# Patient Record
Sex: FEMALE | Race: WHITE | NOT HISPANIC OR LATINO | Employment: UNEMPLOYED | URBAN - METROPOLITAN AREA
[De-identification: names, ages, dates, MRNs, and addresses within clinical notes are randomized per-mention and may not be internally consistent; named-entity substitution may affect disease eponyms.]

---

## 2023-10-23 ENCOUNTER — HOSPITAL ENCOUNTER (EMERGENCY)
Facility: CLINIC | Age: 48
Discharge: HOME | End: 2023-10-23
Attending: EMERGENCY MEDICINE | Admitting: EMERGENCY MEDICINE
Payer: MEDICAID

## 2023-10-23 VITALS
OXYGEN SATURATION: 100 % | HEART RATE: 74 BPM | WEIGHT: 126.2 LBS | RESPIRATION RATE: 18 BRPM | DIASTOLIC BLOOD PRESSURE: 61 MMHG | TEMPERATURE: 98 F | SYSTOLIC BLOOD PRESSURE: 116 MMHG | BODY MASS INDEX: 20.28 KG/M2 | HEIGHT: 66 IN

## 2023-10-23 DIAGNOSIS — F11.90 OPIOID USE DISORDER: ICD-10-CM

## 2023-10-23 LAB
ALBUMIN SERPL BCG-MCNC: 4.6 G/DL (ref 3.5–5.2)
ALP SERPL-CCNC: 165 U/L (ref 35–104)
ALT SERPL W P-5'-P-CCNC: 46 U/L (ref 0–50)
ANION GAP SERPL CALCULATED.3IONS-SCNC: 13 MMOL/L (ref 7–15)
AST SERPL W P-5'-P-CCNC: 30 U/L (ref 0–45)
BASOPHILS # BLD AUTO: 0.1 10E3/UL (ref 0–0.2)
BASOPHILS NFR BLD AUTO: 1 %
BILIRUB SERPL-MCNC: 0.7 MG/DL
BUN SERPL-MCNC: 11 MG/DL (ref 6–20)
CALCIUM SERPL-MCNC: 9.2 MG/DL (ref 8.6–10)
CHLORIDE SERPL-SCNC: 102 MMOL/L (ref 98–107)
CREAT SERPL-MCNC: 0.7 MG/DL (ref 0.51–0.95)
DEPRECATED HCO3 PLAS-SCNC: 25 MMOL/L (ref 22–29)
EGFRCR SERPLBLD CKD-EPI 2021: >90 ML/MIN/1.73M2
EOSINOPHIL # BLD AUTO: 0.1 10E3/UL (ref 0–0.7)
EOSINOPHIL NFR BLD AUTO: 2 %
ERYTHROCYTE [DISTWIDTH] IN BLOOD BY AUTOMATED COUNT: 13.2 % (ref 10–15)
GLUCOSE SERPL-MCNC: 113 MG/DL (ref 70–99)
HCT VFR BLD AUTO: 43.5 % (ref 35–47)
HGB BLD-MCNC: 14.6 G/DL (ref 11.7–15.7)
IMM GRANULOCYTES # BLD: 0 10E3/UL
IMM GRANULOCYTES NFR BLD: 0 %
LYMPHOCYTES # BLD AUTO: 1.4 10E3/UL (ref 0.8–5.3)
LYMPHOCYTES NFR BLD AUTO: 22 %
MCH RBC QN AUTO: 29.9 PG (ref 26.5–33)
MCHC RBC AUTO-ENTMCNC: 33.6 G/DL (ref 31.5–36.5)
MCV RBC AUTO: 89 FL (ref 78–100)
MONOCYTES # BLD AUTO: 0.4 10E3/UL (ref 0–1.3)
MONOCYTES NFR BLD AUTO: 6 %
NEUTROPHILS # BLD AUTO: 4.6 10E3/UL (ref 1.6–8.3)
NEUTROPHILS NFR BLD AUTO: 69 %
NRBC # BLD AUTO: 0 10E3/UL
NRBC BLD AUTO-RTO: 0 /100
PLATELET # BLD AUTO: 264 10E3/UL (ref 150–450)
POTASSIUM SERPL-SCNC: 3.8 MMOL/L (ref 3.4–5.3)
PROT SERPL-MCNC: 7.6 G/DL (ref 6.4–8.3)
RBC # BLD AUTO: 4.89 10E6/UL (ref 3.8–5.2)
SODIUM SERPL-SCNC: 140 MMOL/L (ref 135–145)
WBC # BLD AUTO: 6.7 10E3/UL (ref 4–11)

## 2023-10-23 PROCEDURE — 36415 COLL VENOUS BLD VENIPUNCTURE: CPT | Performed by: EMERGENCY MEDICINE

## 2023-10-23 PROCEDURE — 99283 EMERGENCY DEPT VISIT LOW MDM: CPT | Performed by: EMERGENCY MEDICINE

## 2023-10-23 PROCEDURE — 85014 HEMATOCRIT: CPT | Performed by: EMERGENCY MEDICINE

## 2023-10-23 PROCEDURE — 80053 COMPREHEN METABOLIC PANEL: CPT | Performed by: EMERGENCY MEDICINE

## 2023-10-23 ASSESSMENT — ACTIVITIES OF DAILY LIVING (ADL)
ADLS_ACUITY_SCORE: 35

## 2023-10-23 ASSESSMENT — LIFESTYLE VARIABLES: TOTAL_SCORE: 2

## 2023-10-23 NOTE — ED PROVIDER NOTES
"    [facility] EMERGENCY DEPARTMENT ([facility])    10/23/23      ED PROVIDER NOTE   Isaias B  History     Chief Complaint   Patient presents with    Addiction Problem     Patient is here for detox for fentanyl, she states her last use was this morning. She states to be using about a 1/2 gram a day. Patient states at the moment to feel irritable, hungry.      The history is provided by the patient and medical records.     Niki Salamanca is a 48 year old female who presents to the emergency department seeking assistance with opiate use disorder.  She has been using half a gram of fentanyl a day, last use was 7 AM this morning.  She is starting to withdraw and is feeling irritable and hungry, though notes that the major symptoms of withdrawal usually take effect 6 to 8 hours after last use.  Patient states that withdrawal symptoms are full body aches and \"feels like I am dying\".  Patient states she takes half a gram a day to not feel withdrawals.  Patient has been using Fentanyl for the past couple years.  Patient was on methadone 5 to 6 years ago however stopped medication cold turkey.  Patient states she is tired of being on fentanyl and having withdrawals and her kids miss her.  Patient states she would like anything to help and would like to go to treatment.  Patient gets fentanyl from \"everywhere\", including her friends and smokes it.  Patient denies IV drug use at this time however has a past history of IV use.  Patient denies falls, fever, chills, shortness of breath.  Patient would rather go to treatment as opposed to at home prescription medication.  Patient is depressed however denies suicidal ideation.  Last COWS score of 2.    Past Medical History  No past medical history on file.  No past surgical history on file.  No current outpatient medications on file.    Allergies   Allergen Reactions    Penicillins      Family History  No family history on file.  Social History          A medically " "appropriate review of systems was performed with pertinent positives and negatives noted in the HPI, and all other systems negative.    Physical Exam   BP: 116/61  Pulse: 74  Temp: 98  F (36.7  C)  Resp: 18  Height: 167.6 cm (5' 6\")  Weight: 57.2 kg (126 lb 3.2 oz)  SpO2: 100 %    Physical Exam  General:  No acute distress.  No piloerection or yawning  HENT:  Normocephalic and atraumatic.  Poor dentition  Eyes: EOMI. Conjunctivae normal.   Cardiovascular: Normal rate and regular rhythm.  Normal heart sounds. No murmur heard.  Pulmonary:  No respiratory distress. Normal breath sounds.   Abdominal: There is no distension.  Abdomen is soft. There is no mass.  There is no abdominal tenderness.   Musculoskeletal: No swelling or tenderness.  Moving all extremities spontaneously.    Skin: Warm and dry  Neurological: No focal deficit present.   Mood and Affect: Mood normal.     ED Course, Procedures, & Data      Procedures          Results for orders placed or performed during the hospital encounter of 10/23/23   Comprehensive metabolic panel     Status: Abnormal   Result Value Ref Range    Sodium 140 135 - 145 mmol/L    Potassium 3.8 3.4 - 5.3 mmol/L    Carbon Dioxide (CO2) 25 22 - 29 mmol/L    Anion Gap 13 7 - 15 mmol/L    Urea Nitrogen 11.0 6.0 - 20.0 mg/dL    Creatinine 0.70 0.51 - 0.95 mg/dL    GFR Estimate >90 >60 mL/min/1.73m2    Calcium 9.2 8.6 - 10.0 mg/dL    Chloride 102 98 - 107 mmol/L    Glucose 113 (H) 70 - 99 mg/dL    Alkaline Phosphatase 165 (H) 35 - 104 U/L    AST 30 0 - 45 U/L    ALT 46 0 - 50 U/L    Protein Total 7.6 6.4 - 8.3 g/dL    Albumin 4.6 3.5 - 5.2 g/dL    Bilirubin Total 0.7 <=1.2 mg/dL   CBC with platelets and differential     Status: None   Result Value Ref Range    WBC Count 6.7 4.0 - 11.0 10e3/uL    RBC Count 4.89 3.80 - 5.20 10e6/uL    Hemoglobin 14.6 11.7 - 15.7 g/dL    Hematocrit 43.5 35.0 - 47.0 %    MCV 89 78 - 100 fL    MCH 29.9 26.5 - 33.0 pg    MCHC 33.6 31.5 - 36.5 g/dL    RDW 13.2 " 10.0 - 15.0 %    Platelet Count 264 150 - 450 10e3/uL    % Neutrophils 69 %    % Lymphocytes 22 %    % Monocytes 6 %    % Eosinophils 2 %    % Basophils 1 %    % Immature Granulocytes 0 %    NRBCs per 100 WBC 0 <1 /100    Absolute Neutrophils 4.6 1.6 - 8.3 10e3/uL    Absolute Lymphocytes 1.4 0.8 - 5.3 10e3/uL    Absolute Monocytes 0.4 0.0 - 1.3 10e3/uL    Absolute Eosinophils 0.1 0.0 - 0.7 10e3/uL    Absolute Basophils 0.1 0.0 - 0.2 10e3/uL    Absolute Immature Granulocytes 0.0 <=0.4 10e3/uL    Absolute NRBCs 0.0 10e3/uL   CBC with platelets differential     Status: None    Narrative    The following orders were created for panel order CBC with platelets differential.  Procedure                               Abnormality         Status                     ---------                               -----------         ------                     CBC with platelets and d...[[id number]]                      Final result                 Please view results for these tests on the individual orders.     Medications - No data to display  Labs Ordered and Resulted from Time of ED Arrival to Time of ED Departure   COMPREHENSIVE METABOLIC PANEL - Abnormal       Result Value    Sodium 140      Potassium 3.8      Carbon Dioxide (CO2) 25      Anion Gap 13      Urea Nitrogen 11.0      Creatinine 0.70      GFR Estimate >90      Calcium 9.2      Chloride 102      Glucose 113 (*)     Alkaline Phosphatase 165 (*)     AST 30      ALT 46      Protein Total 7.6      Albumin 4.6      Bilirubin Total 0.7     CBC WITH PLATELETS AND DIFFERENTIAL    WBC Count 6.7      RBC Count 4.89      Hemoglobin 14.6      Hematocrit 43.5      MCV 89      MCH 29.9      MCHC 33.6      RDW 13.2      Platelet Count 264      % Neutrophils 69      % Lymphocytes 22      % Monocytes 6      % Eosinophils 2      % Basophils 1      % Immature Granulocytes 0      NRBCs per 100 WBC 0      Absolute Neutrophils 4.6      Absolute Lymphocytes 1.4      Absolute Monocytes 0.4       Absolute Eosinophils 0.1      Absolute Basophils 0.1      Absolute Immature Granulocytes 0.0      Absolute NRBCs 0.0     HCG QUALITATIVE URINE     No orders to display          Critical care was not performed.     Medical Decision Making  The patient's presentation was of moderate complexity (2 or more stable chronic illnesses).    The patient's evaluation involved:  review of external note(s) from 3+ sources (see separate area of note for details)  ordering and/or review of 3+ test(s) in this encounter (see separate area of note for details)  review of 3+ test result(s) ordered prior to this encounter (see separate area of note for details)    The patient's management necessitated low risk treatment    Assessment & Plan    Patient presents emergency department wanting to quit fentanyl use, which she has been using daily.  She requests a treatment center, rather than being started on buprenorphine, as she does not feel that she would be able to quit on her own at home.  Exam is unremarkable.  She was hungry and provided food which she tolerated without difficulty.  COWS score is 2, not high enough to start emergency department induction.  Patient was accepted to an opioid detox center, and nursing provided a cab so she could go directly there.  CBC, CMP, beta hCG, urine drug screen initially ordered (not because patient was having symptoms) but in case the facility required it before patient arrival.  They did not require any work-up.  CBC and CMP had resulted.  Alkaline phosphatase is elevated 165, however no elevation in her liver enzymes or bilirubin.  Patient was discharged in a cab to go directly to detox, and they are able to initiate buprenorphine for her.      I have reviewed the nursing notes. I have reviewed the findings, diagnosis, plan and need for follow up with the patient.    New Prescriptions    No medications on file       Final diagnoses:   None     [facility] EMERGENCY  DEPARTMENT  10/23/2023     Stefanie Trejo MD  10/23/23 9931

## 2023-10-23 NOTE — ED TRIAGE NOTES
Patient is here for detox for fentanyl, she states her last use was this morning. She states to be using about a 1/2 gram a day. Patient states at the moment to feel irritable, hungry.      Triage Assessment (Adult)       Row Name 10/23/23 1042          Triage Assessment    Airway WDL WDL        Respiratory WDL    Respiratory WDL WDL        Skin Circulation/Temperature WDL    Skin Circulation/Temperature WDL WDL        Cardiac WDL    Cardiac WDL WDL        Peripheral/Neurovascular WDL    Peripheral Neurovascular WDL WDL        Cognitive/Neuro/Behavioral WDL    Cognitive/Neuro/Behavioral WDL WDL

## 2023-10-23 NOTE — DISCHARGE INSTRUCTIONS
Go directly to treatment center from the emergency department.      Substance Use Disorder Direct Access Resources    It is recommended that you abstain from all mood altering chemicals. Please contact the sober support hotline ([phone]) as needed; phones are answered 24 hours a day, 7 days a week.    To access substance use treatment you must have a comprehensive assessment completed to begin any treatment program.     If uninsured, please contact your county of residence for eligibility screen to substance use disorder evaluation and treatment:    [place] - [phone]   [place]  [phone]   [facility] [phone]   [place]  [phone]   [place]  [phone]   [facility] [phone]   [facility] [phone]   [place] - [phone]     If you have private insurance, call the customer service number on the back of your insurance card to find an in-network substance abuse use disorder assessment. The ideal provider will be a treatment facility, licensed in the [facility].     Community THERESE Evaluations: Clients may call their county for a full list of providers - Availability and services listed belo are subject to change, please call the provider to confirm    [facility]  [phone]  [facility] [place], [place], [address]  *Please call the above number to schedule a comprehensive assessment for determination of level of care needs. In person and virtual appointments available Mon-Fri.    [facility] [address] Floor, [address], [place] [address] (next to the outpatient lab)    Phone: [phone]   Provides bridging services to people with Opiate Use Disorders (OUD) seeking care. This is a front door to Medication Assisted Treatments (MAT), ages 16+  Walk In hours: Monday-Friday 9:00am-3:00pm    [facility]  [phone]  Walk in Assessments: Mon-Friday 7a-1:45p  [address], [place], [address]    [facility]  [facility]  Central Access [phone]  [address] [place], [place], [address]  *by appointment only    [place]  [phone] (phone consultation available )  Locations in: [place], [place], [place], [place], and [place], [place]  Thai virtual IOP [phone] or visit [email or web address]   Also offers LGBTQ programming     [facility]  [phone]  [address]  [place], [place], [address]  *Currently only offered via telehealth - call to set up an appointment    [facility]  [address] [place], [place], [address]  [facility]  For more information to to make a referral call:  [phone]  Walk-in on   9-11 a.m.    [facility]  [phone]  [address] [place], [place], [address]  *available by appointments only    [facility]  [phone]  [address] [place], [place], [address]  *available by appointment only    [facility]  [phone]  [address] [place], [place], [address]  *walk in assessments available M-F starting at 7 am.    [facility]  [phone]  Locations: [facility], [facility], and [place]  *Walk in assessments availble M-F starting at 8 am -virtual only    [facility]  [phone]  [address] [place] [address]    [facility]  Virtual + Locations: [place], [place], [place], [place], [facility]], [place], [place], [place]   [phone]  *available by appointment only    [facility]  [phone]  [address]  [place], [place], [address]    [facility]  [phone]  [address] [place], [place], [address]  *available by appointment    [facility]  [phone]  [address] [place], [place], [address]  *walk ins available M-TH from 9-3    [facility] program: [phone]  [address] [place], [place], [address]    [place]  [phone]  Same day substance use disorder assessments are available Monday - Friday, via walk-in or by appointment at the [place] location.  [address], [address], [place] [address]     [facility] - adolescent and adult SUDs services  [phone]  Offer services Monday through Friday, as well as evening hours Monday through Thursday. Normally, a first appointment will be scheduled within one week  [email or web address]  Locations all over [place]    If you are intoxicated, you may be required to detox at a detox facility before starting treatment. The following are detox facilities that you can self present to. All detox facilities are able to help you complete an assessment prior to discharge if you choose:    [facility]: Arrive at a [place] [facility] for immediate medical evaluation    [facility]: [address] [place], [place], [address].         [phone]    [facility] [place]: [address] [place], [place], [address]  [phone]     [facility] ([facility]): [address] [place], [place], [address] [phone]     [facility]: [address] [place], [place], [address], [phone]         Ways to help cope with sobriety:    -- Take prescribed medicines as scheduled  -- Keep follow-up appointments  -- Talk to others about your concerns  -- Get regular exercise  -- Practice deep breathing skills  -- Eat a healthy diet  -- Use community resources, including hotline numbers, [facility] crisis and support meetings  -- Stay sober and avoid places/people/things associated with substance use  --Maintain a daily schedule/routine  --Get at least 7-8 hours of sleep per night  --Create a list 10--20 healthy activities that you can do that are enjoyable and do not involve substance use  --Create daily goals (approx. 1-4 goals) per day and work to  achieve them throughout the day.       Free Resources:    [facility] ([facility])  [facility] connects people seeking recovery to resources that help foster and sustain long-term recovery. Whether you are seeking resources for treatment, transportation, housing, job training, education, health care or other pathways to recovery, [facility] is a great place to start.  Phone: 678.980.8001. www.[place]Uniweb.ru.[employer] (Great listing of all types of recovery and non-recovery related resources)    Alcoholics Anonymous  Phone: 7-731-ALCOHOL  Website: HTTP://WWW.AA.ORG/  [employer] [place] (806-052-4191 or http://aaCloudSync.org)  [facility] [place] (903-612-7503 or www.aastpaul.org)     Narcotics Anonymous  Phone: 101.315.7984  Website: www.Recruit.net.[employer].    [employer] [address], [place], [place],  Phone: [phone]  Drop-in Hours: Monday-Friday 9-11:30 am. By appointment at other times.  Provides: [facility] is a drop-in center on the east side [facility] that provides a safe space for individuals who are homeless and have a history of chemical use. Sobriety is not a requirement but drugs and alcohol are not allowed on the property.  Services: Non-clients can access drop-in services such as Recovery and Harm Reduction Groups, referrals to case management, community activities, shower facilities, and a pool table. Individuals who are homeless and have chemical health needs may be eligible for enrollment into [facility]'s case management program. Clients and  work together to access benefits, treatment, health care, shelter, and external housing resources.